# Patient Record
Sex: FEMALE | Race: WHITE | Employment: UNEMPLOYED | URBAN - METROPOLITAN AREA
[De-identification: names, ages, dates, MRNs, and addresses within clinical notes are randomized per-mention and may not be internally consistent; named-entity substitution may affect disease eponyms.]

---

## 2020-10-29 ENCOUNTER — HOSPITAL ENCOUNTER (EMERGENCY)
Age: 35
Discharge: HOME | End: 2020-10-30
Attending: EMERGENCY MEDICINE
Payer: COMMERCIAL

## 2020-10-29 ENCOUNTER — APPOINTMENT (OUTPATIENT)
Dept: GENERAL RADIOLOGY | Age: 35
End: 2020-10-29
Payer: COMMERCIAL

## 2020-10-29 LAB
ANION GAP SERPL CALCULATED.3IONS-SCNC: 11 MMOL/L (ref 3–16)
BASOPHILS ABSOLUTE: 0.1 K/UL (ref 0–0.2)
BASOPHILS RELATIVE PERCENT: 0.7 %
BILIRUBIN URINE: NEGATIVE
BLOOD, URINE: ABNORMAL
BUN BLDV-MCNC: 13 MG/DL (ref 7–20)
CALCIUM SERPL-MCNC: 9.7 MG/DL (ref 8.3–10.6)
CHLORIDE BLD-SCNC: 100 MMOL/L (ref 99–110)
CLARITY: CLEAR
CO2: 26 MMOL/L (ref 21–32)
COLOR: YELLOW
CREAT SERPL-MCNC: 0.7 MG/DL (ref 0.6–1.1)
EOSINOPHILS ABSOLUTE: 0.5 K/UL (ref 0–0.6)
EOSINOPHILS RELATIVE PERCENT: 4.6 %
EPITHELIAL CELLS, UA: ABNORMAL /HPF (ref 0–5)
GFR AFRICAN AMERICAN: >60
GFR NON-AFRICAN AMERICAN: >60
GLUCOSE BLD-MCNC: 121 MG/DL (ref 70–99)
GLUCOSE URINE: NEGATIVE MG/DL
HCT VFR BLD CALC: 40.8 % (ref 36–48)
HEMOGLOBIN: 13.2 G/DL (ref 12–16)
KETONES, URINE: NEGATIVE MG/DL
LACTIC ACID, SEPSIS: 0.9 MMOL/L (ref 0.4–1.9)
LEUKOCYTE ESTERASE, URINE: NEGATIVE
LYMPHOCYTES ABSOLUTE: 2.7 K/UL (ref 1–5.1)
LYMPHOCYTES RELATIVE PERCENT: 23.2 %
MCH RBC QN AUTO: 25.6 PG (ref 26–34)
MCHC RBC AUTO-ENTMCNC: 32.2 G/DL (ref 31–36)
MCV RBC AUTO: 79.3 FL (ref 80–100)
MICROSCOPIC EXAMINATION: YES
MONOCYTES ABSOLUTE: 0.7 K/UL (ref 0–1.3)
MONOCYTES RELATIVE PERCENT: 5.9 %
NEUTROPHILS ABSOLUTE: 7.6 K/UL (ref 1.7–7.7)
NEUTROPHILS RELATIVE PERCENT: 65.6 %
NITRITE, URINE: NEGATIVE
PDW BLD-RTO: 14.7 % (ref 12.4–15.4)
PH UA: 7 (ref 5–8)
PLATELET # BLD: 258 K/UL (ref 135–450)
PMV BLD AUTO: 7.3 FL (ref 5–10.5)
POTASSIUM SERPL-SCNC: 3.5 MMOL/L (ref 3.5–5.1)
PROCALCITONIN: 0.02 NG/ML (ref 0–0.15)
PROTEIN UA: NEGATIVE MG/DL
RBC # BLD: 5.14 M/UL (ref 4–5.2)
RBC UA: ABNORMAL /HPF (ref 0–4)
SODIUM BLD-SCNC: 137 MMOL/L (ref 136–145)
SPECIFIC GRAVITY UA: 1.01 (ref 1–1.03)
URINE REFLEX TO CULTURE: ABNORMAL
URINE TYPE: ABNORMAL
UROBILINOGEN, URINE: 0.2 E.U./DL
WBC # BLD: 11.6 K/UL (ref 4–11)
WBC UA: ABNORMAL /HPF (ref 0–5)

## 2020-10-29 PROCEDURE — 99283 EMERGENCY DEPT VISIT LOW MDM: CPT

## 2020-10-29 PROCEDURE — 84145 PROCALCITONIN (PCT): CPT

## 2020-10-29 PROCEDURE — 71046 X-RAY EXAM CHEST 2 VIEWS: CPT

## 2020-10-29 PROCEDURE — 36415 COLL VENOUS BLD VENIPUNCTURE: CPT

## 2020-10-29 PROCEDURE — 80048 BASIC METABOLIC PNL TOTAL CA: CPT

## 2020-10-29 PROCEDURE — 84703 CHORIONIC GONADOTROPIN ASSAY: CPT

## 2020-10-29 PROCEDURE — 85025 COMPLETE CBC W/AUTO DIFF WBC: CPT

## 2020-10-29 PROCEDURE — 81001 URINALYSIS AUTO W/SCOPE: CPT

## 2020-10-29 PROCEDURE — 83605 ASSAY OF LACTIC ACID: CPT

## 2020-10-29 PROCEDURE — 2580000003 HC RX 258: Performed by: EMERGENCY MEDICINE

## 2020-10-29 RX ORDER — PSYLLIUM HUSK 0.4 G
1000 CAPSULE ORAL DAILY
COMMUNITY

## 2020-10-29 RX ORDER — 0.9 % SODIUM CHLORIDE 0.9 %
1000 INTRAVENOUS SOLUTION INTRAVENOUS ONCE
Status: COMPLETED | OUTPATIENT
Start: 2020-10-29 | End: 2020-10-30

## 2020-10-29 RX ADMIN — SODIUM CHLORIDE 1000 ML: 9 INJECTION, SOLUTION INTRAVENOUS at 23:39

## 2020-10-30 VITALS
RESPIRATION RATE: 16 BRPM | HEART RATE: 94 BPM | BODY MASS INDEX: 44.98 KG/M2 | DIASTOLIC BLOOD PRESSURE: 71 MMHG | OXYGEN SATURATION: 99 % | TEMPERATURE: 97.6 F | WEIGHT: 270 LBS | SYSTOLIC BLOOD PRESSURE: 134 MMHG | HEIGHT: 65 IN

## 2020-10-30 LAB — HCG QUALITATIVE: NEGATIVE

## 2020-10-30 NOTE — ED NOTES
Discharge instructions reviewed with Pt. Pt verbalizes understanding at this time. VS as noted. Pt condition stable at this time. No concerns voiced.       Parris Zhao RN  10/30/20 8350

## 2020-10-30 NOTE — ED PROVIDER NOTES
CHIEF COMPLAINT  Blood Infection (patient believes she has sepsis because had thyroid checked and having hot flashes and cold spells, turned cold for a few hours )      HISTORY OF PRESENT ILLNESS  Yecenia Alberts is a 38 y.o. female who presents to the ED with report of hx of \"thyroid issues\" and hot and cold flashes, also states has had hair falling out, sometimes joint aches. No hx of autoimmune disease. No rashes. no other complaints, modifying factors or associated symptoms. I have reviewed the following from the nursing documentation. History reviewed. No pertinent past medical history. Past Surgical History:   Procedure Laterality Date    DILATION AND CURETTAGE OF UTERUS      SHOULDER SURGERY      WISDOM TOOTH EXTRACTION       History reviewed. No pertinent family history.   Social History     Socioeconomic History    Marital status:      Spouse name: Not on file    Number of children: Not on file    Years of education: Not on file    Highest education level: Not on file   Occupational History    Not on file   Social Needs    Financial resource strain: Not on file    Food insecurity     Worry: Not on file     Inability: Not on file    Transportation needs     Medical: Not on file     Non-medical: Not on file   Tobacco Use    Smoking status: Never Smoker    Smokeless tobacco: Never Used   Substance and Sexual Activity    Alcohol use: No    Drug use: No    Sexual activity: Yes     Partners: Male   Lifestyle    Physical activity     Days per week: Not on file     Minutes per session: Not on file    Stress: Not on file   Relationships    Social connections     Talks on phone: Not on file     Gets together: Not on file     Attends [employer] service: Not on file     Active member of club or organization: Not on file     Attends meetings of clubs or organizations: Not on file     Relationship status: Not on file    Intimate partner violence     Fear of current or ex partner: Not on file Emotionally abused: Not on file     Physically abused: Not on file     Forced sexual activity: Not on file   Other Topics Concern    Not on file   Social History Narrative    Not on file     Current Facility-Administered Medications   Medication Dose Route Frequency Provider Last Rate Last Dose    0.9 % sodium chloride bolus  1,000 mL Intravenous Once Eliezeron Blizzard, DO         Current Outpatient Medications   Medication Sig Dispense Refill    Omega-3 Fatty Acids (OMEGA-3 FISH OIL CONCENTRATE PO) Take by mouth      NONFORMULARY Glucose disposal agent      NONFORMULARY mitocore vitamin supplement      Multiple Vitamins-Minerals (MULTIVITAMIN ADULT PO) Take by mouth daily      vitamin D (VITAMIN D-1000 MAX ST) 25 MCG (1000 UT) TABS tablet Take 1,000 Units by mouth daily       Allergies   Allergen Reactions    Tylenol Pm Extra Strength [Diphenhydramine-Apap (Sleep)] Other (See Comments)     Numbness in tongue and arms    Nickel Rash       REVIEW OF SYSTEMS  10 systems reviewed, pertinent positives per HPI otherwise noted to be negative. PHYSICAL EXAM  BP (!) 156/100   Pulse 115   Temp 97.6 °F (36.4 °C) (Oral)   Resp 16   Ht 5' 5\" (1.651 m)   Wt 270 lb (122.5 kg)   LMP 10/28/2020   SpO2 100%   Breastfeeding Unknown   BMI 44.93 kg/m²   GENERAL APPEARANCE: Awake and alert. Cooperative. No acute distress. HEAD: Normocephalic. Atraumatic. EYES: PERRL. EOM's grossly intact. ENT: Mucous membranes are moist.   NECK: Supple. HEART: RRR. CHEST/LUNGS: Chest atraumatic, nontender, respirations unlabored. CTAB. Good air exchange. Speaking comfortably in full sentences. BACK: No midline spinal tenderness or step-off. ABDOMEN: Soft. Non-distended. Non-tender. No guarding or rebound. Normal bowel sounds. EXTREMITIES: No peripheral edema. Moves all extremities equally. All extremities neurovascularly intact. RECTAL/: Deferred  SKIN: Warm and dry. No acute rashes.    NEUROLOGICAL: Alert and oriented. CN 2-12 intact, No gross facial drooping. Strength 5/5, sensation intact. Normal coordination. Gait normal.   PSYCHIATRIC: Normal mood and affect. LABS  I have reviewed all labs for this visit.    Results for orders placed or performed during the hospital encounter of 77/44/73   Basic Metabolic Panel   Result Value Ref Range    Sodium 137 136 - 145 mmol/L    Potassium 3.5 3.5 - 5.1 mmol/L    Chloride 100 99 - 110 mmol/L    CO2 26 21 - 32 mmol/L    Anion Gap 11 3 - 16    Glucose 121 (H) 70 - 99 mg/dL    BUN 13 7 - 20 mg/dL    CREATININE 0.7 0.6 - 1.1 mg/dL    GFR Non-African American >60 >60    GFR African American >60 >60    Calcium 9.7 8.3 - 10.6 mg/dL   CBC Auto Differential   Result Value Ref Range    WBC 11.6 (H) 4.0 - 11.0 K/uL    RBC 5.14 4.00 - 5.20 M/uL    Hemoglobin 13.2 12.0 - 16.0 g/dL    Hematocrit 40.8 36.0 - 48.0 %    MCV 79.3 (L) 80.0 - 100.0 fL    MCH 25.6 (L) 26.0 - 34.0 pg    MCHC 32.2 31.0 - 36.0 g/dL    RDW 14.7 12.4 - 15.4 %    Platelets 516 985 - 916 K/uL    MPV 7.3 5.0 - 10.5 fL    Neutrophils % 65.6 %    Lymphocytes % 23.2 %    Monocytes % 5.9 %    Eosinophils % 4.6 %    Basophils % 0.7 %    Neutrophils Absolute 7.6 1.7 - 7.7 K/uL    Lymphocytes Absolute 2.7 1.0 - 5.1 K/uL    Monocytes Absolute 0.7 0.0 - 1.3 K/uL    Eosinophils Absolute 0.5 0.0 - 0.6 K/uL    Basophils Absolute 0.1 0.0 - 0.2 K/uL   Lactate, Sepsis   Result Value Ref Range    Lactic Acid, Sepsis 0.9 0.4 - 1.9 mmol/L   Urinalysis Reflex to Culture    Specimen: Urine, clean catch   Result Value Ref Range    Color, UA Yellow Straw/Yellow    Clarity, UA Clear Clear    Glucose, Ur Negative Negative mg/dL    Bilirubin Urine Negative Negative    Ketones, Urine Negative Negative mg/dL    Specific Gravity, UA 1.015 1.005 - 1.030    Blood, Urine LARGE (A) Negative    pH, UA 7.0 5.0 - 8.0    Protein, UA Negative Negative mg/dL    Urobilinogen, Urine 0.2 <2.0 E.U./dL    Nitrite, Urine Negative Negative    Leukocyte Esterase, Urine Negative Negative    Microscopic Examination YES     Urine Type NotGiven     Urine Reflex to Culture Not Indicated    Procalcitonin   Result Value Ref Range    Procalcitonin 0.02 0.00 - 0.15 ng/mL   Microscopic Urinalysis   Result Value Ref Range    WBC, UA 3-5 0 - 5 /HPF    RBC, UA 5-10 (A) 0 - 4 /HPF    Epithelial Cells, UA 21-50 (A) 0 - 5 /HPF   HCG Qualitative, Serum   Result Value Ref Range    hCG Qual Negative Detects HCG level >10 MIU/mL     RADIOLOGY  X-RAYS:  I have reviewed radiologic plain film image(s). ALL OTHER NON-PLAIN FILM IMAGES SUCH AS CT, ULTRASOUND AND MRI HAVE BEEN READ BY THE RADIOLOGIST. XR CHEST (2 VW)   Final Result   No acute cardiopulmonary abnormality. ED COURSE/MDM  Patient seen and evaluated. I do think it is possible pt may have underlying autoimmune issue that needs w/u, will f/u with pcp. I estimate there is LOW risk for ACUTE CORONARY SYNDROME, INTRACRANIAL HEMORRHAGE, MALIGNANT DYSRHYTHMIA, MENINGITIS, PNEUMONIA, PULMONARY EMBOLISM, SEPSIS, SUBARACHNOID HEMORRHAGE, SUBDURAL HEMATOMA, STROKE, or URINARY TRACT INFECTION, thus I consider the discharge disposition reasonable. Wale Bullock and I have discussed the diagnosis and risks, and we agree with discharging home to follow-up with their primary doctor. We also discussed returning to the Emergency Department immediately if new or worsening symptoms occur. We have discussed the symptoms which are most concerning (e.g., changing or worsening pain, weakness, vomiting, fever) that necessitate immediate return. All diagnostic tests reviewed and results discussed with patient. Plan of care discussed with patient. Patient in agreement with plan. Discharge Medication List as of 10/30/2020 12:21 AM          CLINICAL IMPRESSION  1. Malaise    2. Hot flashes        Blood pressure (!) 156/100, pulse 115, temperature 97.6 °F (36.4 °C), temperature source Oral, resp.  rate 16, height 5' 5\" (1.651 m), weight 270 lb (122.5 kg), last menstrual period 10/28/2020, SpO2 100 %, unknown if currently breastfeeding. Ivonne Tam was discharged to home in stable condition. This chart was generated in part by using Dragon Dictation system and may contain errors related to that system including errors in grammar, punctuation, and spelling, as well as words and phrases that may be inappropriate. When dictating, effort is made to correct spelling/grammar errors. If there are any questions or concerns please feel free to contact the dictating provider for clarification.      Reema Betancourt DO  ATTENDING, [address] [place] [address], DO  11/04/20 3906

## 2022-09-15 ENCOUNTER — APPOINTMENT (OUTPATIENT)
Dept: CT IMAGING | Age: 37
End: 2022-09-15
Payer: COMMERCIAL

## 2022-09-15 ENCOUNTER — HOSPITAL ENCOUNTER (EMERGENCY)
Age: 37
Discharge: HOME | End: 2022-09-15
Attending: EMERGENCY MEDICINE
Payer: COMMERCIAL

## 2022-09-15 ENCOUNTER — APPOINTMENT (OUTPATIENT)
Dept: GENERAL RADIOLOGY | Age: 37
End: 2022-09-15
Payer: COMMERCIAL

## 2022-09-15 VITALS
RESPIRATION RATE: 18 BRPM | SYSTOLIC BLOOD PRESSURE: 110 MMHG | DIASTOLIC BLOOD PRESSURE: 69 MMHG | HEIGHT: 64 IN | OXYGEN SATURATION: 97 % | TEMPERATURE: 98.1 F | WEIGHT: 260 LBS | BODY MASS INDEX: 44.39 KG/M2 | HEART RATE: 66 BPM

## 2022-09-15 DIAGNOSIS — R07.9 CHEST PAIN, UNSPECIFIED TYPE: Primary | ICD-10-CM

## 2022-09-15 LAB
A/G RATIO: 1.2 (ref 1.1–2.2)
ALBUMIN SERPL-MCNC: 4 G/DL (ref 3.4–5)
ALP BLD-CCNC: 79 U/L (ref 40–129)
ALT SERPL-CCNC: 14 U/L (ref 10–40)
ANION GAP SERPL CALCULATED.3IONS-SCNC: 11 MMOL/L (ref 3–16)
AST SERPL-CCNC: 18 U/L (ref 15–37)
BILIRUB SERPL-MCNC: 0.4 MG/DL (ref 0–1)
BUN BLDV-MCNC: 14 MG/DL (ref 7–20)
CALCIUM SERPL-MCNC: 8.8 MG/DL (ref 8.3–10.6)
CHLORIDE BLD-SCNC: 100 MMOL/L (ref 99–110)
CO2: 21 MMOL/L (ref 21–32)
CREAT SERPL-MCNC: 0.6 MG/DL (ref 0.6–1.1)
D DIMER: 0.98 UG/ML FEU (ref 0–0.6)
EKG ATRIAL RATE: 81 BPM
EKG DIAGNOSIS: NORMAL
EKG P AXIS: 64 DEGREES
EKG P-R INTERVAL: 136 MS
EKG Q-T INTERVAL: 382 MS
EKG QRS DURATION: 84 MS
EKG QTC CALCULATION (BAZETT): 443 MS
EKG R AXIS: 50 DEGREES
EKG T AXIS: 19 DEGREES
EKG VENTRICULAR RATE: 81 BPM
GFR AFRICAN AMERICAN: >60
GFR NON-AFRICAN AMERICAN: >60
GLUCOSE BLD-MCNC: 107 MG/DL (ref 70–99)
HCG QUALITATIVE: NEGATIVE
HCT VFR BLD CALC: 42.2 % (ref 36–48)
HEMOGLOBIN: 13.4 G/DL (ref 12–16)
LIPASE: 29 U/L (ref 13–60)
MCH RBC QN AUTO: 26.7 PG (ref 26–34)
MCHC RBC AUTO-ENTMCNC: 31.8 G/DL (ref 31–36)
MCV RBC AUTO: 83.8 FL (ref 80–100)
PDW BLD-RTO: 14.5 % (ref 12.4–15.4)
PLATELET # BLD: 220 K/UL (ref 135–450)
PMV BLD AUTO: 7.5 FL (ref 5–10.5)
POTASSIUM SERPL-SCNC: 3.8 MMOL/L (ref 3.5–5.1)
RBC # BLD: 5.04 M/UL (ref 4–5.2)
REASON FOR REJECTION: NORMAL
REJECTED TEST: NORMAL
SODIUM BLD-SCNC: 132 MMOL/L (ref 136–145)
TOTAL PROTEIN: 7.3 G/DL (ref 6.4–8.2)
TROPONIN: <0.01 NG/ML
TROPONIN: <0.01 NG/ML
WBC # BLD: 11 K/UL (ref 4–11)

## 2022-09-15 PROCEDURE — 84484 ASSAY OF TROPONIN QUANT: CPT

## 2022-09-15 PROCEDURE — 93010 ELECTROCARDIOGRAM REPORT: CPT | Performed by: INTERNAL MEDICINE

## 2022-09-15 PROCEDURE — 84703 CHORIONIC GONADOTROPIN ASSAY: CPT

## 2022-09-15 PROCEDURE — 71046 X-RAY EXAM CHEST 2 VIEWS: CPT

## 2022-09-15 PROCEDURE — 6360000004 HC RX CONTRAST MEDICATION: Performed by: NURSE PRACTITIONER

## 2022-09-15 PROCEDURE — 99285 EMERGENCY DEPT VISIT HI MDM: CPT

## 2022-09-15 PROCEDURE — 93005 ELECTROCARDIOGRAM TRACING: CPT | Performed by: NURSE PRACTITIONER

## 2022-09-15 PROCEDURE — 36415 COLL VENOUS BLD VENIPUNCTURE: CPT

## 2022-09-15 PROCEDURE — 85027 COMPLETE CBC AUTOMATED: CPT

## 2022-09-15 PROCEDURE — 71260 CT THORAX DX C+: CPT | Performed by: NURSE PRACTITIONER

## 2022-09-15 PROCEDURE — 80053 COMPREHEN METABOLIC PANEL: CPT

## 2022-09-15 PROCEDURE — 83690 ASSAY OF LIPASE: CPT

## 2022-09-15 PROCEDURE — 85379 FIBRIN DEGRADATION QUANT: CPT

## 2022-09-15 PROCEDURE — 93005 ELECTROCARDIOGRAM TRACING: CPT | Performed by: EMERGENCY MEDICINE

## 2022-09-15 RX ORDER — ASPIRIN 81 MG/1
81 TABLET ORAL DAILY
Qty: 15 TABLET | Refills: 1 | Status: SHIPPED | OUTPATIENT
Start: 2022-09-15

## 2022-09-15 RX ADMIN — IOPAMIDOL 75 ML: 755 INJECTION, SOLUTION INTRAVENOUS at 16:17

## 2022-09-15 ASSESSMENT — PAIN SCALES - GENERAL: PAINLEVEL_OUTOF10: 2

## 2022-09-15 ASSESSMENT — PAIN - FUNCTIONAL ASSESSMENT
PAIN_FUNCTIONAL_ASSESSMENT: 0-10
PAIN_FUNCTIONAL_ASSESSMENT: NONE - DENIES PAIN
PAIN_FUNCTIONAL_ASSESSMENT: NONE - DENIES PAIN

## 2022-09-15 ASSESSMENT — PAIN DESCRIPTION - LOCATION: LOCATION: CHEST

## 2022-09-15 NOTE — ED PROVIDER NOTES
I was not asked to see this patient. I was available for consultation if deemed necessary. I was only asked to interpret the EKG. Please see NP Ken's note for care of the patient while in the emergency department and for final disposition. The Ekg interpreted by me shows  Sinus rhythm with marked sinus arrhythmia  with a rate of 81  Axis is   Normal  QTc is  normal  Intervals and Durations are unremarkable.       ST Segments: nonspecific changes  No significant change from prior EKG dated - no old EKG  No STEMI          Flaquito Gibson MD  09/15/22 5442

## 2022-09-15 NOTE — ED PROVIDER NOTES
[facility] Emergency Department    CHIEF COMPLAINT  Chest Pain (Started at approximately one hour ago. States squeezing pain under left breast, that radiates to back.)      HISTORY OF PRESENT ILLNESS  Caryl Helms is a 44 y.o. female who presents to the ED complaining of chest pain. Patient reports she was eating lunch with her daughters when she suddenly experienced a pain underneath her left breast that radiated into her mid epigastric abdominal region and into the middle of her back. Patient reports it felt like a squeezing sensation. She suddenly felt very cold, diaphoretic, and got really \"pale. \"  Patient reports that she thought that maybe it was just \"indigestion\" that she finished lunch symptoms seem to improve and then she started to feel lightheaded. Patient reports then the pain returned and she started to also have radiation to her left arm she said her left arm felt very heavy and weak. Patient reports associated nausea and frequent belching. Patient denies associated shortness of breath, syncope, palpitations, emesis, diarrhea, leg swelling, calf pain. Patient denies recent illness, fever, cough, recent travel or known sick contacts. Patient has no known history of hypertension, hyperlipidemia, diabetes. Patient is not on estrogen or birth control. No history of DVT or pulmonary embolism. No family history of coronary artery disease. Patient reports she is never experienced pain like this before. On arrival to emergency department patient reports all of her symptoms have resolved and she is chest pain-free. No other complaints, modifying factors or associated symptoms. Nursing notes reviewed. History reviewed. No pertinent past medical history. Past Surgical History:   Procedure Laterality Date    DILATION AND CURETTAGE OF UTERUS      SHOULDER SURGERY      WISDOM TOOTH EXTRACTION       History reviewed. No pertinent family history.   Social History Socioeconomic History    Marital status:      Spouse name: Not on file    Number of children: Not on file    Years of education: Not on file    Highest education level: Not on file   Occupational History    Not on file   Tobacco Use    Smoking status: Never    Smokeless tobacco: Never   Vaping Use    Vaping Use: Never used   Substance and Sexual Activity    Alcohol use: No    Drug use: No    Sexual activity: Yes     Partners: Male   Other Topics Concern    Not on file   Social History Narrative    Not on file     Social Determinants of Health     Financial Resource Strain: Not on file   Food Insecurity: Not on file   Transportation Needs: Not on file   Physical Activity: Not on file   Stress: Not on file   Social Connections: Not on file   Intimate Partner Violence: Not on file   Housing Stability: Not on file     No current facility-administered medications for this encounter. Current Outpatient Medications   Medication Sig Dispense Refill    Multiple Vitamins-Minerals (MULTIVITAMIN ADULT PO) Take by mouth daily      vitamin D (VITAMIN D-1000 MAX ST) 25 MCG (1000 UT) TABS tablet Take 1,000 Units by mouth daily      Omega-3 Fatty Acids (OMEGA-3 FISH OIL CONCENTRATE PO) Take by mouth      NONFORMULARY Glucose disposal agent      NONFORMULARY mitocore vitamin supplement       Allergies   Allergen Reactions    Tylenol Pm Extra Strength [Diphenhydramine-Apap (Sleep)] Other (See Comments)     Numbness in tongue and arms    Nickel Rash       REVIEW OF SYSTEMS  10 systems reviewed, pertinent positives per HPI otherwise noted to be negative    PHYSICAL EXAM  /87   Pulse 73   Temp 98.1 °F (36.7 °C) (Oral)   Resp 22   Ht 5' 4\" (1.626 m)   Wt 260 lb (117.9 kg)   LMP 09/14/2022   SpO2 100%   BMI 44.63 kg/m²   GENERAL APPEARANCE: Awake and alert. Cooperative. No acute distress. Vital signs are stable. Well appearing and non toxic. HEAD: Normocephalic. Atraumatic. EYES: PERRL. EOM's grossly intact. ENT: Mucous membranes are moist.   NECK: Supple. Normal ROM. HEART: RRR. Distal pulses are equal and intact. Cap refill less than 2 seconds. LUNGS: Respirations unlabored. CTAB. Good air exchange. Speaking comfortably in full sentences. No wheezing, rhonchi, rales, stridor. CHEST: non tender  ABDOMEN: Soft. Non-distended. Mild tenderness to palpation to the midepigastric and left upper quadrant. No guarding or rebound. No rigidity. Bowel sounds are present. Negative chadwick's. Negative McBurney's point. Negative CVA tenderness. EXTREMITIES: No peripheral edema. Moves all extremities equally. All extremities neurovascularly intact. SKIN: Warm and dry. No acute rashes. NEUROLOGICAL: Alert and oriented. No gross facial drooping. Strength 5/5, sensation intact. Speech is clear. PSYCHIATRIC: Normal mood and affect. SCREENINGS       RADIOLOGY  XR CHEST (2 VW)    Result Date: 9/15/2022  EXAMINATION: TWO XRAY VIEWS OF THE CHEST 9/15/2022 1:45 pm COMPARISON: 10/29/2020. HISTORY: ORDERING SYSTEM PROVIDED HISTORY: Chest Discomfort TECHNOLOGIST PROVIDED HISTORY: Reason for exam:->Chest Discomfort Reason for Exam: cp FINDINGS: The lungs and costophrenic angles are clear. The cardiomediastinal silhouette and pulmonary vessels appear normal.     No radiographic evidence of an acute cardiopulmonary process. ED COURSE/MDM  Patient seen and evaluated. Old records reviewed. Diagnostic testing reviewed and results discussed. I have seen this patient in collaboration with supervising physician Dr. Cherry Faria. We thoroughly discussed the history, physical exam, diagnostic testing and emergency department course. [facility] presented to the ED today with above noted complaints. Upon arrival to emergency department vital signs are stable. Patient chest pain-free. Initial work-up EKG interpreted by my attending physician.   Normal sinus rhythm no ST elevation, sign of ischemia or malignant arrhythmia. Blood work unremarkable troponin less than 0.01. No leukocytosis or anemia. No HANH. No transaminitis or sign of pancreatitis. Upon arrival to emergency department patient declined pain medication    D-dimer is elevated at 0.98. CT of the chest obtained and is pending. Patient signed out to my attending physician to follow-up on repeat troponin, repeat EKG, CT of the chest and reexamined patient. This will determine disposition. While in ED patient received   Medications   iopamidol (ISOVUE-370) 76 % injection 75 mL (75 mLs IntraVENous Given 9/15/22 8197)           5:00 PM: I discussed the history, physical, and treatment plan with Dr. Abdiel Ponce. Fany Breath was signed out in stable condition. Please see Dr. Nic Phelps note for further details, including diagnosis and disposition. 1. Chest pain, unspecified type          Comment: Please note this report has been produced using speech recognition software and may contain errors related to that system including errors in grammar, punctuation, and spelling, as well as words and phrases that may be inappropriate. If there are any questions or concerns please feel free to contact the dictating provider for clarification.             Wes Pedraza, MARIA C - GENEVIEVE  09/17/22 3520

## 2022-09-16 LAB
EKG ATRIAL RATE: 66 BPM
EKG DIAGNOSIS: NORMAL
EKG P AXIS: 55 DEGREES
EKG P-R INTERVAL: 152 MS
EKG Q-T INTERVAL: 420 MS
EKG QRS DURATION: 88 MS
EKG QTC CALCULATION (BAZETT): 440 MS
EKG R AXIS: 24 DEGREES
EKG T AXIS: 17 DEGREES
EKG VENTRICULAR RATE: 66 BPM

## 2022-09-16 PROCEDURE — 93010 ELECTROCARDIOGRAM REPORT: CPT | Performed by: INTERNAL MEDICINE

## 2022-09-16 NOTE — ED PROVIDER NOTES
Emergency Department Attending Provider Note  Location: [facility]  ED  9/15/2022     Patient Identification  Ilsa Alonzo is a 44 y.o. female      Ilsa Alonzo was evaluated in the Emergency Department for chest pain. At the time of my exam she is symptom-free. Denies any shortness of breath. . Although initial history and physical exam information was obtained by HOLLY/NPP/MD/DO (who also dictated a record of this visit), I personally saw the patient and performed a substantive portion of the visit including all aspects of the medical decision making. EKG Interpretation  Rhythm is normal sinus rhythm  Rate is 65  Axis is normal  No STEMI criteria  Qtc is 440      Patient seen and evaluated. Relevant records reviewed. 77-year-old female obese otherwise no cardiac risk factors presents after episode of chest pain now resolved. On exam well-appearing no acute distress reassuring vitals. EKG without acute process x2, serial troponins negative. Not consistent with PE dissection. No evidence of pneumonia. Considered ACS think this is unlikely and she is low risk category by heart score. Do feel that outpatient cardiology and stress stress test is appropriate. Discussed close return precautions and patient is agreeable to discharge at this time and the follow-up plan. I, Dr. Rian Irizarry, am the primary clinician of record. I personally saw the patient and independently provided 0 minutes of non-concurrent critical care out of the total shared critical care time provided. This chart was generated in part by using Dragon Dictation system and may contain errors related to that system including errors in grammar, punctuation, and spelling, as well as words and phrases that may be inappropriate. If there are any questions or concerns please feel free to contact the dictating provider for clarification.      MD Dejan Mata MD  09/15/22 2041

## 2022-09-16 NOTE — ED NOTES
This RN to the room to provide pt with DC/Med Admin/Care/Follow up instructions. Pt verbalizes understanding. Pt ambulates with steady gait, chest rise and fall equal, resps easy unlabored, no signs of distress noted.       Mahala Cockayne, RN  09/15/22 2029

## 2023-09-06 ENCOUNTER — HOSPITAL ENCOUNTER (EMERGENCY)
Age: 38
LOS: 1 days | Discharge: HOME | End: 2023-09-07
Payer: COMMERCIAL

## 2023-09-06 VITALS
DIASTOLIC BLOOD PRESSURE: 92 MMHG | OXYGEN SATURATION: 100 % | HEART RATE: 88 BPM | TEMPERATURE: 97.88 F | SYSTOLIC BLOOD PRESSURE: 145 MMHG | RESPIRATION RATE: 16 BRPM

## 2023-09-06 DIAGNOSIS — T61.11XA: Primary | ICD-10-CM

## 2023-09-06 PROCEDURE — 99281 EMR DPT VST MAYX REQ PHY/QHP: CPT

## 2023-09-07 VITALS — OXYGEN SATURATION: 100 %

## 2023-09-07 VITALS
DIASTOLIC BLOOD PRESSURE: 79 MMHG | RESPIRATION RATE: 17 BRPM | HEART RATE: 74 BPM | OXYGEN SATURATION: 98 % | SYSTOLIC BLOOD PRESSURE: 140 MMHG

## 2023-09-07 VITALS
RESPIRATION RATE: 20 BRPM | DIASTOLIC BLOOD PRESSURE: 84 MMHG | HEART RATE: 76 BPM | SYSTOLIC BLOOD PRESSURE: 144 MMHG | OXYGEN SATURATION: 100 %

## 2023-11-19 ENCOUNTER — APPOINTMENT (OUTPATIENT)
Dept: GENERAL RADIOLOGY | Age: 38
End: 2023-11-19
Payer: COMMERCIAL

## 2023-11-19 ENCOUNTER — HOSPITAL ENCOUNTER (EMERGENCY)
Age: 38
Discharge: HOME | End: 2023-11-19
Attending: STUDENT IN AN ORGANIZED HEALTH CARE EDUCATION/TRAINING PROGRAM
Payer: COMMERCIAL

## 2023-11-19 VITALS
OXYGEN SATURATION: 96 % | SYSTOLIC BLOOD PRESSURE: 121 MMHG | BODY MASS INDEX: 42.68 KG/M2 | WEIGHT: 250 LBS | DIASTOLIC BLOOD PRESSURE: 82 MMHG | TEMPERATURE: 98.2 F | HEIGHT: 64 IN | HEART RATE: 85 BPM | RESPIRATION RATE: 11 BRPM

## 2023-11-19 DIAGNOSIS — J02.9 VIRAL PHARYNGITIS: Primary | ICD-10-CM

## 2023-11-19 LAB
ALBUMIN SERPL-MCNC: 3.9 G/DL (ref 3.4–5)
ALBUMIN/GLOB SERPL: 1.3 {RATIO} (ref 1.1–2.2)
ALP SERPL-CCNC: 88 U/L (ref 40–129)
ALT SERPL-CCNC: 10 U/L (ref 10–40)
ANION GAP SERPL CALCULATED.3IONS-SCNC: 12 MMOL/L (ref 3–16)
AST SERPL-CCNC: 12 U/L (ref 15–37)
BASOPHILS # BLD: 0 K/UL (ref 0–0.2)
BASOPHILS NFR BLD: 0.6 %
BILIRUB SERPL-MCNC: <0.2 MG/DL (ref 0–1)
BUN SERPL-MCNC: 12 MG/DL (ref 7–20)
CALCIUM SERPL-MCNC: 8.8 MG/DL (ref 8.3–10.6)
CHLORIDE SERPL-SCNC: 102 MMOL/L (ref 99–110)
CO2 SERPL-SCNC: 24 MMOL/L (ref 21–32)
CREAT SERPL-MCNC: 0.6 MG/DL (ref 0.6–1.1)
DEPRECATED RDW RBC AUTO: 15.6 % (ref 12.4–15.4)
EKG ATRIAL RATE: 85 BPM
EKG DIAGNOSIS: NORMAL
EKG P AXIS: 63 DEGREES
EKG P-R INTERVAL: 156 MS
EKG Q-T INTERVAL: 376 MS
EKG QRS DURATION: 82 MS
EKG QTC CALCULATION (BAZETT): 447 MS
EKG R AXIS: 40 DEGREES
EKG T AXIS: 16 DEGREES
EKG VENTRICULAR RATE: 85 BPM
EOSINOPHIL # BLD: 0.4 K/UL (ref 0–0.6)
EOSINOPHIL NFR BLD: 6.4 %
GFR SERPLBLD CREATININE-BSD FMLA CKD-EPI: >60 ML/MIN/{1.73_M2}
GLUCOSE SERPL-MCNC: 124 MG/DL (ref 70–99)
HCT VFR BLD AUTO: 37.3 % (ref 36–48)
HGB BLD-MCNC: 12 G/DL (ref 12–16)
LYMPHOCYTES # BLD: 1.9 K/UL (ref 1–5.1)
LYMPHOCYTES NFR BLD: 34.4 %
MAGNESIUM SERPL-MCNC: 2.1 MG/DL (ref 1.8–2.4)
MCH RBC QN AUTO: 24.4 PG (ref 26–34)
MCHC RBC AUTO-ENTMCNC: 32.2 G/DL (ref 31–36)
MCV RBC AUTO: 75.6 FL (ref 80–100)
MONOCYTES # BLD: 0.5 K/UL (ref 0–1.3)
MONOCYTES NFR BLD: 9.2 %
NEUTROPHILS # BLD: 2.8 K/UL (ref 1.7–7.7)
NEUTROPHILS NFR BLD: 49.4 %
PLATELET # BLD AUTO: 191 K/UL (ref 135–450)
PMV BLD AUTO: 7.2 FL (ref 5–10.5)
POTASSIUM SERPL-SCNC: 3.4 MMOL/L (ref 3.5–5.1)
PROT SERPL-MCNC: 7 G/DL (ref 6.4–8.2)
RBC # BLD AUTO: 4.93 M/UL (ref 4–5.2)
S PYO AG THROAT QL: NEGATIVE
SODIUM SERPL-SCNC: 138 MMOL/L (ref 136–145)
TROPONIN, HIGH SENSITIVITY: <6 NG/L (ref 0–14)
WBC # BLD AUTO: 5.6 K/UL (ref 4–11)

## 2023-11-19 PROCEDURE — 99285 EMERGENCY DEPT VISIT HI MDM: CPT

## 2023-11-19 PROCEDURE — 84484 ASSAY OF TROPONIN QUANT: CPT

## 2023-11-19 PROCEDURE — 87081 CULTURE SCREEN ONLY: CPT

## 2023-11-19 PROCEDURE — 93005 ELECTROCARDIOGRAM TRACING: CPT | Performed by: STUDENT IN AN ORGANIZED HEALTH CARE EDUCATION/TRAINING PROGRAM

## 2023-11-19 PROCEDURE — 6360000002 HC RX W HCPCS: Performed by: STUDENT IN AN ORGANIZED HEALTH CARE EDUCATION/TRAINING PROGRAM

## 2023-11-19 PROCEDURE — 87880 STREP A ASSAY W/OPTIC: CPT

## 2023-11-19 PROCEDURE — 96374 THER/PROPH/DIAG INJ IV PUSH: CPT

## 2023-11-19 PROCEDURE — 71045 X-RAY EXAM CHEST 1 VIEW: CPT

## 2023-11-19 PROCEDURE — 80053 COMPREHEN METABOLIC PANEL: CPT

## 2023-11-19 PROCEDURE — 83735 ASSAY OF MAGNESIUM: CPT

## 2023-11-19 PROCEDURE — 70360 X-RAY EXAM OF NECK: CPT

## 2023-11-19 PROCEDURE — 85025 COMPLETE CBC W/AUTO DIFF WBC: CPT

## 2023-11-19 PROCEDURE — 93010 ELECTROCARDIOGRAM REPORT: CPT | Performed by: INTERNAL MEDICINE

## 2023-11-19 PROCEDURE — 6370000000 HC RX 637 (ALT 250 FOR IP): Performed by: STUDENT IN AN ORGANIZED HEALTH CARE EDUCATION/TRAINING PROGRAM

## 2023-11-19 RX ORDER — DEXAMETHASONE SODIUM PHOSPHATE 4 MG/ML
10 INJECTION, SOLUTION INTRA-ARTICULAR; INTRALESIONAL; INTRAMUSCULAR; INTRAVENOUS; SOFT TISSUE ONCE
Status: COMPLETED | OUTPATIENT
Start: 2023-11-19 | End: 2023-11-19

## 2023-11-19 RX ORDER — FLUTICASONE PROPIONATE 50 MCG
2 SPRAY, SUSPENSION (ML) NASAL DAILY
Qty: 16 G | Refills: 1 | Status: SHIPPED | OUTPATIENT
Start: 2023-11-19

## 2023-11-19 RX ORDER — IPRATROPIUM BROMIDE AND ALBUTEROL SULFATE 2.5; .5 MG/3ML; MG/3ML
1 SOLUTION RESPIRATORY (INHALATION) ONCE
Status: COMPLETED | OUTPATIENT
Start: 2023-11-19 | End: 2023-11-19

## 2023-11-19 RX ORDER — PREDNISONE 50 MG/1
50 TABLET ORAL DAILY
Qty: 5 TABLET | Refills: 0 | Status: SHIPPED | OUTPATIENT
Start: 2023-11-19 | End: 2023-11-24

## 2023-11-19 RX ORDER — ALBUTEROL SULFATE 90 UG/1
2 AEROSOL, METERED RESPIRATORY (INHALATION) 4 TIMES DAILY PRN
Qty: 18 G | Refills: 1 | Status: SHIPPED | OUTPATIENT
Start: 2023-11-19

## 2023-11-19 RX ADMIN — DEXAMETHASONE SODIUM PHOSPHATE 10 MG: 4 INJECTION, SOLUTION INTRAMUSCULAR; INTRAVENOUS at 07:22

## 2023-11-19 RX ADMIN — IPRATROPIUM BROMIDE AND ALBUTEROL SULFATE 1 DOSE: 2.5; .5 SOLUTION RESPIRATORY (INHALATION) at 07:25

## 2023-11-19 NOTE — ED NOTES
RN discussed discharge instructions with pt and family including follow up and medications. Pt and family verbalized understanding. All questions were answered. IV removed with no complications. Pt left stable and ambulatory with family and all belongings.         Aishwarya Guy RN  11/19/23 3511

## 2023-11-19 NOTE — ED NOTES
During pt rounds RN reassess pt breathing; pt stated that her breathing is better.      Niko Andrade RN  11/19/23 9727

## 2023-11-21 LAB — S PYO THROAT QL CULT: NORMAL
